# Patient Record
Sex: MALE | Race: WHITE | Employment: FULL TIME
[De-identification: names, ages, dates, MRNs, and addresses within clinical notes are randomized per-mention and may not be internally consistent; named-entity substitution may affect disease eponyms.]

---

## 2019-11-05 ENCOUNTER — HEALTH MAINTENANCE LETTER (OUTPATIENT)
Age: 39
End: 2019-11-05

## 2020-02-05 ENCOUNTER — OFFICE VISIT (OUTPATIENT)
Dept: FAMILY MEDICINE | Facility: CLINIC | Age: 40
End: 2020-02-05
Payer: COMMERCIAL

## 2020-02-05 VITALS
OXYGEN SATURATION: 100 % | SYSTOLIC BLOOD PRESSURE: 112 MMHG | BODY MASS INDEX: 23.22 KG/M2 | WEIGHT: 153.2 LBS | HEIGHT: 68 IN | RESPIRATION RATE: 16 BRPM | DIASTOLIC BLOOD PRESSURE: 72 MMHG | TEMPERATURE: 99.5 F | HEART RATE: 87 BPM

## 2020-02-05 DIAGNOSIS — R05.9 COUGH: ICD-10-CM

## 2020-02-05 DIAGNOSIS — R50.81 FEVER IN OTHER DISEASES: Primary | ICD-10-CM

## 2020-02-05 LAB
FLUAV+FLUBV AG SPEC QL: NEGATIVE
FLUAV+FLUBV AG SPEC QL: NEGATIVE
SPECIMEN SOURCE: NORMAL

## 2020-02-05 PROCEDURE — 99213 OFFICE O/P EST LOW 20 MIN: CPT | Performed by: FAMILY MEDICINE

## 2020-02-05 PROCEDURE — 87804 INFLUENZA ASSAY W/OPTIC: CPT | Performed by: FAMILY MEDICINE

## 2020-02-05 RX ORDER — BENZONATATE 100 MG/1
100 CAPSULE ORAL 3 TIMES DAILY PRN
Qty: 42 CAPSULE | Refills: 0 | Status: SHIPPED | OUTPATIENT
Start: 2020-02-05

## 2020-02-05 ASSESSMENT — MIFFLIN-ST. JEOR: SCORE: 1579.41

## 2020-02-05 NOTE — NURSING NOTE
"Chief Complaint   Patient presents with     URI     /72   Pulse 87   Temp 99.5  F (37.5  C) (Oral)   Resp 16   Ht 1.727 m (5' 8\")   Wt 69.5 kg (153 lb 3.2 oz)   SpO2 100%   BMI 23.29 kg/m   Estimated body mass index is 23.29 kg/m  as calculated from the following:    Height as of this encounter: 1.727 m (5' 8\").    Weight as of this encounter: 69.5 kg (153 lb 3.2 oz).  bp completed using cuff size: regular      Health Maintenance addressed:  NONE    n/a    Courtney Wagner MA    "

## 2020-02-05 NOTE — PROGRESS NOTES
Subjective     Alex Hammond is a 39 year old male who presents to clinic today for the following health issues:    HPI   RESPIRATORY SYMPTOMS      Duration: 3 weeks    Description  nasal congestion, cough, fever, chills and body aches    Severity: severe    Accompanying signs and symptoms: productive cough, cough is the worse symptom    History (predisposing factors):  5-6 years since last sinus infection. Bronchitis 4 years ago    Precipitating or alleviating factors: None    Therapies tried and outcome:  rest and fluids mucin ex no real relief, and cough drops soothe    Cough x 3 wks.  Thought it was a bronchitis.  Was starting to lessen in severity.    However, sunday night (3 nights ago), cough worsened again, really bad all night.  Continued Monday.  This morning (wed), wasn't feeling well.   Body aches, chills.  Temp at home 100F.  Congestion in last few days as well in sinuses, headache this morning.    Wife is pregnant, have 2 1/3 yo daughter.  Daughter has 102F, dx with ear infection today.  Tested for flu - negative.  Sx's- fevers, congestion, lethargic.  Her sx's started Sun.    He did get the flu shot.        Patient Active Problem List   Diagnosis     CARDIOVASCULAR SCREENING; LDL GOAL LESS THAN 160     Past Surgical History:   Procedure Laterality Date     HERNIORRHAPHY INGUINAL  6/12/2012    Procedure: HERNIORRHAPHY INGUINAL;;  Surgeon: Gabriel Le MD;  Location: [facility]     TENOTOMY HIP ADDUCTOR  6/12/2012    Procedure: TENOTOMY HIP ADDUCTOR;  RIGHT ADDUCTOR RELEASE, RIGHT GROIN EXPLORATION WITH SPORTS HERNIA REPAIR (MARIUM);  Surgeon: Vahid Wills MD;  Location: [facility]     wisdom teeth extraction[         Social History     Tobacco Use     Smoking status: Never Smoker     Smokeless tobacco: Never Used   Substance Use Topics     Alcohol use: Yes     Family History   Problem Relation Age of Onset     C.A.D. Father          Current Outpatient Medications   Medication Sig Dispense  "Refill     benzonatate (TESSALON PERLES) 100 MG capsule Take 1 capsule (100 mg) by mouth 3 times daily as needed for cough 42 capsule 0     NO ACTIVE MEDICATIONS        No Known Allergies  No lab results found.   BP Readings from Last 3 Encounters:   02/05/20 112/72   06/12/12 105/55   06/07/12 118/70    Wt Readings from Last 3 Encounters:   02/05/20 69.5 kg (153 lb 3.2 oz)   06/12/12 66.9 kg (147 lb 6.4 oz)   06/07/12 68.3 kg (150 lb 8 oz)            Reviewed and updated as needed this visit by Provider  Tobacco  Allergies  Meds  Problems  Med Hx  Surg Hx  Fam Hx         Review of Systems   ROS COMP: Constitutional, HEENT, cardiovascular, pulmonary, gi and gu systems are negative, except as otherwise noted.      Objective    /72   Pulse 87   Temp 99.5  F (37.5  C) (Oral)   Resp 16   Ht 1.727 m (5' 8\")   Wt 69.5 kg (153 lb 3.2 oz)   SpO2 100%   BMI 23.29 kg/m    Body mass index is 23.29 kg/m .  Physical Exam   GENERAL APPEARANCE: pleasant, alert and no distress     EYES: PERRL, sclera clear     HENT: nares erythematous with clear rhinorrhea, oropharynx without erythema, swelling or exudate, sinuses non-tender to palpation, TM's normal with good light reflex     NECK: no adenopathy, no asymmetry, masses, or scars and thyroid normal to palpation     RESP: lungs clear to auscultation - no rales, rhonchi or wheezes     CV: regular rates and rhythm, normal S1 S2, no S3 or S4 and no murmur, click or rub      Ext: warm, dry, no edema        Diagnostic Test Results:  Labs reviewed in Epic  Results for orders placed or performed in visit on 02/05/20 (from the past 24 hour(s))   Influenza A/B antigen   Result Value Ref Range    Influenza A/B Agn Specimen Nasal     Influenza A Negative NEG^Negative    Influenza B Negative NEG^Negative           Assessment & Plan       ICD-10-CM    1. Fever in other diseases R50.81 Influenza A/B antigen   2. Cough R05 benzonatate (TESSALON PERLES) 100 MG capsule "       Suspect another viral illness s/p improving cough for 2-3 weeks.  Now with body aches and low-grade fever today, with worsening cough x 3 days.  Flu pending.  If positive, will treat given wife's pregnancy, <48 hrs of flu sx's.  If negative, rec tessalon pearles for cough, and consider umcka for likely viral URI.  RTC if symptoms persist or worsen.       Return in about 3 months (around 5/5/2020) for Physical Exam.    Marlen Jiménez MD  [facility]

## 2020-11-22 ENCOUNTER — HEALTH MAINTENANCE LETTER (OUTPATIENT)
Age: 40
End: 2020-11-22

## 2021-09-19 ENCOUNTER — HEALTH MAINTENANCE LETTER (OUTPATIENT)
Age: 41
End: 2021-09-19

## 2022-01-09 ENCOUNTER — HEALTH MAINTENANCE LETTER (OUTPATIENT)
Age: 42
End: 2022-01-09

## 2022-11-20 ENCOUNTER — HEALTH MAINTENANCE LETTER (OUTPATIENT)
Age: 42
End: 2022-11-20

## 2023-04-15 ENCOUNTER — HEALTH MAINTENANCE LETTER (OUTPATIENT)
Age: 43
End: 2023-04-15